# Patient Record
Sex: MALE | NOT HISPANIC OR LATINO | URBAN - METROPOLITAN AREA
[De-identification: names, ages, dates, MRNs, and addresses within clinical notes are randomized per-mention and may not be internally consistent; named-entity substitution may affect disease eponyms.]

---

## 2019-09-09 ENCOUNTER — OFFICE VISIT (OUTPATIENT)
Dept: URBAN - METROPOLITAN AREA CLINIC 32 | Facility: CLINIC | Age: 59
End: 2019-09-09
Payer: COMMERCIAL

## 2019-09-09 DIAGNOSIS — H52.4 PRESBYOPIA: Primary | ICD-10-CM

## 2019-09-09 PROCEDURE — 92014 COMPRE OPH EXAM EST PT 1/>: CPT | Performed by: OPTOMETRIST

## 2019-09-09 PROCEDURE — 92310 CONTACT LENS FITTING OU: CPT | Performed by: OPTOMETRIST

## 2019-09-09 ASSESSMENT — KERATOMETRY
OS: 42.75
OD: 43.25

## 2019-09-09 ASSESSMENT — INTRAOCULAR PRESSURE
OS: 8
OD: 11

## 2021-02-04 ENCOUNTER — OFFICE VISIT (OUTPATIENT)
Dept: URBAN - METROPOLITAN AREA CLINIC 7 | Facility: CLINIC | Age: 61
End: 2021-02-04
Payer: COMMERCIAL

## 2021-02-04 DIAGNOSIS — H04.123 DRY EYE SYNDROME OF BILATERAL LACRIMAL GLANDS: ICD-10-CM

## 2021-02-04 DIAGNOSIS — H43.392 OTHER VITREOUS OPACITIES, LEFT EYE: ICD-10-CM

## 2021-02-04 DIAGNOSIS — Z96.1 PRESENCE OF INTRAOCULAR LENS: ICD-10-CM

## 2021-02-04 DIAGNOSIS — H33.8 OTHER RETINAL DETACHMENTS: ICD-10-CM

## 2021-02-04 PROCEDURE — 92134 CPTRZ OPH DX IMG PST SGM RTA: CPT | Performed by: OPHTHALMOLOGY

## 2021-02-04 PROCEDURE — 99214 OFFICE O/P EST MOD 30 MIN: CPT | Performed by: OPHTHALMOLOGY

## 2021-02-04 ASSESSMENT — INTRAOCULAR PRESSURE
OS: 17
OD: 15

## 2021-02-04 NOTE — IMPRESSION/PLAN
Impression: Vitreous debris, OS. Status: Symptomatic. Plan: The patient has a large floaters OS in his visual axis. He is having difficulty working on the computer to work. Discussed options of observation vs surgery. The patient will consider his options.  If he elects surgery, plan: 25g PPV / MP / PRP OS

## 2021-02-04 NOTE — IMPRESSION/PLAN
Impression: Glaucoma, OU. Status: Symptomatic. C/D: 0.3, OU Plan: Continue to coordinate care. First Trimester Sonogram

## 2021-02-04 NOTE — IMPRESSION/PLAN
Impression: Chronic CME, OD. Status: Symptomatic.
s/p IVK x 13 last 10/20/16
s/p Avastin x 15 last 04/18/19 Plan: The patient was lost to follow up. The patient prefers to be seen in [facility]. A 3 month interval was too long in the past. Was able to treat and extend to 10 weeks in the past. Exam and OCT reveal CME OD (310 microns, from 440 microns). An IVFA from 02/04/2021 demonstrated no telangiectasia. Fundus Photos from 02/04/2021 demonstrated no IRH. Observe. Thanks, Dalila Milton. 

Return in 12 months for re eval CME, OCT OU, possible Avastin, IVFA OD 1st

## 2021-02-04 NOTE — IMPRESSION/PLAN
Impression: h/o RD, OD. Status: Symptomatic.
s/p PPV,SB,GAS,PRP,MP, C3F8 gas 03/24/14 (DYK) Plan: Retina flat. RDW.

## 2021-07-09 ENCOUNTER — OFFICE VISIT (OUTPATIENT)
Dept: URBAN - METROPOLITAN AREA CLINIC 37 | Facility: CLINIC | Age: 61
End: 2021-07-09
Payer: COMMERCIAL

## 2021-07-09 DIAGNOSIS — H40.9 UNSPECIFIED GLAUCOMA: ICD-10-CM

## 2021-07-09 PROCEDURE — 99204 OFFICE O/P NEW MOD 45 MIN: CPT | Performed by: OPHTHALMOLOGY

## 2021-07-09 ASSESSMENT — VISUAL ACUITY
OD: 20/20
OS: 20/40

## 2021-07-09 ASSESSMENT — INTRAOCULAR PRESSURE
OD: 10
OS: 12

## 2021-07-09 NOTE — IMPRESSION/PLAN
Impression: Cataract, OS. Status: Symptomatic. Plan: Cataracts account for the patient's complaints. Discussed all risks, benefits, procedures and recovery. Patient understands changing glasses will not improve vision. Patient desires to have surgery, recommend phacoemulsification with intraocular lens. Patient understands the post operative vision may still require correction and vision is not guaranteed; patient may required additional procedure after cataract surgery. If patient elects multifocal lens discussed risk of glare, halos, need for additional light, and possible mild correction for near and intermediate vision. R/B/A's discussed with patient. Patient desires to have surgery, recommend CE IOL OS 2nd eye (monovision). Recommend IOL TARGET:  -2.50 OS. Patient is candidate for Lensx/ora. Ok for [employer] and [employer]ent, LENS TYPE SA60AT. Return for pre-op to further discuss technology based on testing findings.  Patient needs retina clearance prior to sx and updated VFT/OCT/Pachs with Dr Eddie Leary previously followed by [facility] specialist

## 2021-07-09 NOTE — IMPRESSION/PLAN
Impression: Glaucoma, OU. Status: Symptomatic. C/D: 0.3, OU Plan: Latanoprost QHS OS, Dorzolamide/Timolol QD OU .  IOP holding OU; will need updated VF/OCT/Pachs prior to proceeding with cataract surgery with Dr Johann Wolfe for evaluation

## 2021-09-16 ENCOUNTER — OFFICE VISIT (OUTPATIENT)
Dept: URBAN - METROPOLITAN AREA CLINIC 7 | Facility: CLINIC | Age: 61
End: 2021-09-16
Payer: COMMERCIAL

## 2021-09-16 ENCOUNTER — OFFICE VISIT (OUTPATIENT)
Dept: URBAN - METROPOLITAN AREA CLINIC 24 | Facility: CLINIC | Age: 61
End: 2021-09-16
Payer: COMMERCIAL

## 2021-09-16 DIAGNOSIS — H35.81 RETINAL EDEMA: Primary | ICD-10-CM

## 2021-09-16 DIAGNOSIS — H40.1111 PRIMARY OPEN-ANGLE GLAUCOMA, RIGHT EYE, MILD STAGE: Primary | ICD-10-CM

## 2021-09-16 DIAGNOSIS — H43.813 VITREOUS DEGENERATION, BILATERAL: ICD-10-CM

## 2021-09-16 PROCEDURE — 92134 CPTRZ OPH DX IMG PST SGM RTA: CPT | Performed by: OPHTHALMOLOGY

## 2021-09-16 PROCEDURE — 92235 FLUORESCEIN ANGRPH MLTIFRAME: CPT | Performed by: OPHTHALMOLOGY

## 2021-09-16 PROCEDURE — 76514 ECHO EXAM OF EYE THICKNESS: CPT | Performed by: OPHTHALMOLOGY

## 2021-09-16 PROCEDURE — 92020 GONIOSCOPY: CPT | Performed by: OPHTHALMOLOGY

## 2021-09-16 PROCEDURE — 92083 EXTENDED VISUAL FIELD XM: CPT | Performed by: OPHTHALMOLOGY

## 2021-09-16 PROCEDURE — 99214 OFFICE O/P EST MOD 30 MIN: CPT | Performed by: OPHTHALMOLOGY

## 2021-09-16 PROCEDURE — 92133 CPTRZD OPH DX IMG PST SGM ON: CPT | Performed by: OPHTHALMOLOGY

## 2021-09-16 ASSESSMENT — INTRAOCULAR PRESSURE
OS: 15
OD: 10
OD: 12
OS: 10
OS: 10
OS: 15
OD: 10
OD: 12

## 2021-09-16 NOTE — IMPRESSION/PLAN
Impression: PCIOL OD / Cataract, OS. Status: Symptomatic.  Plan: Cleared for YAG OD and CE OS from the retinal standpoint at your discretion

## 2021-09-16 NOTE — IMPRESSION/PLAN
Impression: h/o Chronic CME, OD. Status: Symptomatic.
s/p IVK x 13 last 10/20/16
s/p Avastin x 15 last 04/18/19 Plan: The patient prefers to be seen in [facility]. A 3 month interval was too long in the past. Exam and OCT reveal CME OD (339 microns, from 440 microns). An IVFA from 09/16/2021 demonstrated no telangiectasia. Fundus Photos from 09/16/2021 demonstrated no IRH. Observe. Thanks, Bennie Martinez Return in 6 months for re eval CME, OCT OU, possible Avastin, IVFA OD 1st

## 2021-09-16 NOTE — IMPRESSION/PLAN
Impression: Primary open-angle glaucoma, right eye, mild stage: H40.1111. Plan: Pt has Glaucoma    Gonio :SS trace/ 3+ CBB Posterior bowing        Pachs:   543/530   Today's IOP12/15         Tmax  :  35/15 Target IOP low to mid teens Pt denies Fhx of Glaucoma Right  eye is the better seeing eye  
C/D:  0.6x0.6/0.5x0.5 OCT:61/84 9/16/21 Pt denies Sulfa Allergy   // Pt denies Lung /Heart dx Plan :
1. Continue Latanoprost QAM OU [email or web address]
Dorzolo/Timolol QD OU [email or web address]
2. IOP and condition appear stable today. No changes being made to current regimen. Recommend monitoring condition at this time. 3. No signs of Glaucoma damage OS. (Consider decreasing drops OS S/P Cataract surgery 4.  Patient cleared for cataract surgery with Dr. Ruth Shows

## 2021-09-27 ENCOUNTER — ADULT PHYSICAL (OUTPATIENT)
Dept: URBAN - METROPOLITAN AREA CLINIC 24 | Facility: CLINIC | Age: 61
End: 2021-09-27
Payer: COMMERCIAL

## 2021-09-27 DIAGNOSIS — Z01.818 ENCOUNTER FOR OTHER PREPROCEDURAL EXAMINATION: Primary | ICD-10-CM

## 2021-09-27 PROCEDURE — 99203 OFFICE O/P NEW LOW 30 MIN: CPT | Performed by: PHYSICIAN ASSISTANT

## 2021-09-27 ASSESSMENT — PACHYMETRY
OS: 27.90
OD: 28.13
OS: 3.69
OD: 5.95

## 2021-09-29 ENCOUNTER — PRE-OPERATIVE VISIT (OUTPATIENT)
Dept: URBAN - METROPOLITAN AREA CLINIC 24 | Facility: CLINIC | Age: 61
End: 2021-09-29
Payer: COMMERCIAL

## 2021-09-29 PROCEDURE — 99214 OFFICE O/P EST MOD 30 MIN: CPT | Performed by: OPHTHALMOLOGY

## 2021-09-29 ASSESSMENT — PACHYMETRY
OD: 5.95
OS: 27.90
OD: 28.13
OS: 3.69

## 2021-09-29 NOTE — IMPRESSION/PLAN
Impression: h/o RD, OD. Status: Symptomatic.
s/p PPV,SB,GAS,PRP,MP, C3F8 gas 03/24/14 (DYK) Plan: Retina flat. RDW. monitor. Discussed with patient, understands this may limit vision after surgery.

## 2021-09-29 NOTE — IMPRESSION/PLAN
Impression: Glaucoma, OU. Status: Symptomatic. C/D: 0.3, OU Plan: Continue to coordinate care. Discussed with patient, understands this may limit vision after surgery.

## 2021-09-29 NOTE — IMPRESSION/PLAN
Impression: PCIOL OD / Cataract, OS. Status: Symptomatic. Plan: Discussed diagnosis in detail with patient. Discussed treatment options with patient. R/B/A of yag cap discussed, pt would like to proceed. Yag OD ordered.  RL2

## 2021-09-29 NOTE — IMPRESSION/PLAN
Impression: PVD, OU. Status: Symptomatic. Plan: RDW. monitor. Discussed with patient, understands this may limit vision after surgery.

## 2021-10-25 ENCOUNTER — TESTING ONLY (OUTPATIENT)
Dept: URBAN - METROPOLITAN AREA CLINIC 24 | Facility: CLINIC | Age: 61
End: 2021-10-25
Payer: COMMERCIAL

## 2021-10-26 ENCOUNTER — ADULT PHYSICAL (OUTPATIENT)
Dept: URBAN - METROPOLITAN AREA CLINIC 24 | Facility: CLINIC | Age: 61
End: 2021-10-26
Payer: COMMERCIAL

## 2021-10-26 PROCEDURE — 99213 OFFICE O/P EST LOW 20 MIN: CPT | Performed by: PHYSICIAN ASSISTANT

## 2021-10-29 ENCOUNTER — TESTING ONLY (OUTPATIENT)
Dept: URBAN - METROPOLITAN AREA CLINIC 24 | Facility: CLINIC | Age: 61
End: 2021-10-29
Payer: COMMERCIAL

## 2021-10-29 DIAGNOSIS — H25.12 AGE-RELATED NUCLEAR CATARACT, LEFT EYE: Primary | ICD-10-CM

## 2021-10-29 ASSESSMENT — PACHYMETRY
OD: 28.06
OD: 5.14
OS: 27.94
OS: 3.66

## 2021-11-01 ENCOUNTER — PRE-OPERATIVE VISIT (OUTPATIENT)
Dept: URBAN - METROPOLITAN AREA CLINIC 24 | Facility: CLINIC | Age: 61
End: 2021-11-01
Payer: COMMERCIAL

## 2021-11-01 DIAGNOSIS — H18.599 OTHER HEREDITARY CORNEAL DYSTROPHIES: ICD-10-CM

## 2021-11-01 PROCEDURE — 99214 OFFICE O/P EST MOD 30 MIN: CPT | Performed by: OPHTHALMOLOGY

## 2021-11-01 NOTE — IMPRESSION/PLAN
Impression: Cataract, OS. Status: Symptomatic. Plan: Discussed risks, benefits and alternatives to surgery including but not limited to: bleeding, infection, risk of vision loss, loss of the eye, need for other surgery. Patient voiced understanding and wishes to proceed. Patient elects surgical treatment. Advanced technology options Discussed with patient . Patient desires surgery OU,   first (( AIM -2.50 OS, DEXYCU, Topical anesthesia, NO Lensx/ORA OS, NO LRI (irreg wilian) )) Patient understands the need for glasses after surgery for BCVA.

## 2021-11-01 NOTE — IMPRESSION/PLAN
Impression: Other hereditary corneal dystrophies: H18.599. Plan: Discussed with patient, understands this may limit vision after surgery. Due to corneal dystrophy and inconsistent measurements not a candidate for specialty lenses/ upgrades.

## 2021-11-02 ENCOUNTER — SURGERY (OUTPATIENT)
Dept: URBAN - METROPOLITAN AREA SURGERY 12 | Facility: SURGERY | Age: 61
End: 2021-11-02
Payer: COMMERCIAL

## 2021-11-02 PROCEDURE — 66984 XCAPSL CTRC RMVL W/O ECP: CPT | Performed by: OPHTHALMOLOGY

## 2021-11-03 ENCOUNTER — POST-OPERATIVE VISIT (OUTPATIENT)
Dept: URBAN - METROPOLITAN AREA CLINIC 24 | Facility: CLINIC | Age: 61
End: 2021-11-03
Payer: COMMERCIAL

## 2021-11-03 PROCEDURE — 99024 POSTOP FOLLOW-UP VISIT: CPT | Performed by: OPTOMETRIST

## 2021-11-03 ASSESSMENT — INTRAOCULAR PRESSURE: OS: 23

## 2021-11-03 NOTE — IMPRESSION/PLAN
Impression: S/P Cataract Extraction by phacoemulsification with IOL placement OS - 1 Day. Encounter for surgical aftercare following surgery on a sense organ  Z48.220.  Plan:

## 2021-11-08 ENCOUNTER — SURGERY (OUTPATIENT)
Dept: URBAN - METROPOLITAN AREA SURGERY 12 | Facility: SURGERY | Age: 61
End: 2021-11-08
Payer: COMMERCIAL

## 2021-11-08 PROCEDURE — 66821 AFTER CATARACT LASER SURGERY: CPT | Performed by: OPHTHALMOLOGY

## 2021-11-15 ENCOUNTER — SURGERY (OUTPATIENT)
Dept: URBAN - METROPOLITAN AREA EXTERNAL CLINIC 26 | Facility: EXTERNAL CLINIC | Age: 61
End: 2021-11-15
Payer: COMMERCIAL

## 2021-11-15 PROCEDURE — 67041 VIT FOR MACULAR PUCKER: CPT | Performed by: OPHTHALMOLOGY

## 2021-11-16 ENCOUNTER — POST-OPERATIVE VISIT (OUTPATIENT)
Dept: URBAN - METROPOLITAN AREA CLINIC 7 | Facility: CLINIC | Age: 61
End: 2021-11-16
Payer: COMMERCIAL

## 2021-11-16 PROCEDURE — 99024 POSTOP FOLLOW-UP VISIT: CPT | Performed by: OPHTHALMOLOGY

## 2021-11-16 RX ORDER — DORZOLAMIDE HYDROCHLORIDE 20 MG/ML
2 % SOLUTION OPHTHALMIC
Qty: 5 | Refills: 3 | Status: ACTIVE
Start: 2021-11-16

## 2021-11-16 ASSESSMENT — INTRAOCULAR PRESSURE
OD: 12
OS: 28

## 2021-11-16 NOTE — IMPRESSION/PLAN
Impression: S/P 25g PPV/ MP/ PRP OS x Vitreous debris OS - 1 Day. 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 Plan: doing well
continue combigan and add trusopt RTC 1 week POS OS w/ DYK --Continue Prednisolone acetate 1%--Continue Ofloxacin 0.3%

## 2021-11-23 ENCOUNTER — POST-OPERATIVE VISIT (OUTPATIENT)
Dept: URBAN - METROPOLITAN AREA CLINIC 24 | Facility: CLINIC | Age: 61
End: 2021-11-23
Payer: COMMERCIAL

## 2021-11-23 DIAGNOSIS — Z48.810 ENCOUNTER FOR SURGICAL AFTERCARE FOLLOWING SURGERY ON A SENSE ORGAN: Primary | ICD-10-CM

## 2021-11-23 PROCEDURE — 99024 POSTOP FOLLOW-UP VISIT: CPT | Performed by: OPTOMETRIST

## 2021-11-23 ASSESSMENT — VISUAL ACUITY
OS: 20/150
OD: 20/25-1

## 2021-11-23 ASSESSMENT — INTRAOCULAR PRESSURE
OS: 10
OD: 8

## 2021-11-23 NOTE — IMPRESSION/PLAN
Impression:  Encounter for surgical aftercare following surgery on a sense organ  Z48.810. Plan: Patient happy with outcome OS. s/p PPVIT with Dr. Garland Wiley. Keep appts as scheduled.

## 2021-12-02 ENCOUNTER — POST-OPERATIVE VISIT (OUTPATIENT)
Dept: URBAN - METROPOLITAN AREA CLINIC 41 | Facility: CLINIC | Age: 61
End: 2021-12-02
Payer: COMMERCIAL

## 2021-12-02 PROCEDURE — 99024 POSTOP FOLLOW-UP VISIT: CPT | Performed by: OPHTHALMOLOGY

## 2021-12-02 ASSESSMENT — INTRAOCULAR PRESSURE
OD: 14
OS: 18

## 2021-12-02 NOTE — IMPRESSION/PLAN
Impression: S/P 25g PPV/ MP/ PRP OS x Vitreous debris OS 11/15/2021 Plan: Retina flat. IOP is good. No sings of infection RTC 3 months OCT OU

## 2022-02-03 ENCOUNTER — OFFICE VISIT (OUTPATIENT)
Dept: URBAN - METROPOLITAN AREA CLINIC 7 | Facility: CLINIC | Age: 62
End: 2022-02-03
Payer: COMMERCIAL

## 2022-02-03 PROCEDURE — 92134 CPTRZ OPH DX IMG PST SGM RTA: CPT | Performed by: OPHTHALMOLOGY

## 2022-02-03 PROCEDURE — 99024 POSTOP FOLLOW-UP VISIT: CPT | Performed by: OPHTHALMOLOGY

## 2022-02-03 ASSESSMENT — INTRAOCULAR PRESSURE
OD: 6
OS: 7

## 2022-02-03 NOTE — IMPRESSION/PLAN
Impression: h/o Vitreous debris, OS. 
S/P 25g PPV/ MP/ PRP OS x Vitreous debris OS 11/15/2021 ([facility]) Plan: Follow

## 2022-02-03 NOTE — IMPRESSION/PLAN
Impression: h/o Chronic CME, OD. Status: Symptomatic.
s/p IVK x 13 last 10/20/16
s/p Avastin x 15 last 04/18/19 Plan: A 3 month interval was too long in the past. Exam and OCT reveal resolved CME OD (321 microns). An IVFA from 09/16/2021 demonstrated no telangiectasia. Fundus Photos from 09/16/2021 demonstrated no IRH. Observe. Monty Soto Age Return in 3 months for re eval CME, OCT OU, IVFA OD 1st, then in 6 months if stable

## 2022-03-03 ENCOUNTER — OFFICE VISIT (OUTPATIENT)
Dept: URBAN - METROPOLITAN AREA CLINIC 7 | Facility: CLINIC | Age: 62
End: 2022-03-03
Payer: COMMERCIAL

## 2022-03-03 DIAGNOSIS — H35.352 CYSTOID MACULAR DEGENERATION, LEFT EYE: Primary | ICD-10-CM

## 2022-03-03 PROCEDURE — 92134 CPTRZ OPH DX IMG PST SGM RTA: CPT | Performed by: OPHTHALMOLOGY

## 2022-03-03 PROCEDURE — 92014 COMPRE OPH EXAM EST PT 1/>: CPT | Performed by: OPHTHALMOLOGY

## 2022-03-03 PROCEDURE — 67028 INJECTION EYE DRUG: CPT | Performed by: OPHTHALMOLOGY

## 2022-03-03 ASSESSMENT — INTRAOCULAR PRESSURE
OS: 15
OD: 17

## 2022-03-03 NOTE — IMPRESSION/PLAN
Impression: h/o Chronic CME, OD. Status: Symptomatic.
s/p IVK x 13 last 10/20/16
s/p Avastin x 15 last 04/18/19 Plan: A 3 month interval was too long in the past. Exam and OCT reveal resolved CME OD. An IVFA from 09/16/2021 demonstrated no telangiectasia. Fundus Photos from 09/16/2021 demonstrated no IRH. Observe.

## 2022-03-03 NOTE — IMPRESSION/PLAN
Impression: Retinal edema OS. Plan: The patient notes decreased VA OS. Exam and OCT reveal CME OS (644 microns). Triesence is on back order. Recommend Avastin. RBA discusssed. The patient elects Avastin OS. Return in 4-6 weeks for re-eval edema, OCT OU, IVFA OS 1st

## 2022-03-03 NOTE — IMPRESSION/PLAN
Impression: h/o Vitreous debris, OS. 
S/P 25g PPV/ MP/ PRP OS x Vitreous debris OS 11/15/2021 (930 [facility]) Plan: Follow

## 2022-04-28 ENCOUNTER — OFFICE VISIT (OUTPATIENT)
Dept: URBAN - METROPOLITAN AREA CLINIC 7 | Facility: CLINIC | Age: 62
End: 2022-04-28
Payer: COMMERCIAL

## 2022-04-28 DIAGNOSIS — H33.8 OTHER RETINAL DETACHMENTS: ICD-10-CM

## 2022-04-28 DIAGNOSIS — H43.392 OTHER VITREOUS OPACITIES, LEFT EYE: ICD-10-CM

## 2022-04-28 DIAGNOSIS — H35.352 CYSTOID MACULAR DEGENERATION, LEFT EYE: Primary | ICD-10-CM

## 2022-04-28 PROCEDURE — 92014 COMPRE OPH EXAM EST PT 1/>: CPT | Performed by: OPHTHALMOLOGY

## 2022-04-28 PROCEDURE — 92134 CPTRZ OPH DX IMG PST SGM RTA: CPT | Performed by: OPHTHALMOLOGY

## 2022-04-28 PROCEDURE — 67028 INJECTION EYE DRUG: CPT | Performed by: OPHTHALMOLOGY

## 2022-04-28 ASSESSMENT — INTRAOCULAR PRESSURE
OS: 13
OD: 14

## 2022-04-28 NOTE — IMPRESSION/PLAN
Impression: h/o Vitreous debris, OS. 
S/P 25g PPV/ MP/ PRP OS x Vitreous debris OS 11/15/2021 (930 [facility]) Plan: Follow

## 2022-04-28 NOTE — IMPRESSION/PLAN
Impression: Retinal edema OS.
s/p Avastin x 1 last 03/03/2022 Plan: The patient notes decreased VA OS. Exam and OCT reveal CME OS (331 microns, from 644 microns). Triesence is on back order. Recommend Avastin. RBA discusssed. The patient elects Avastin OS. Return in 4-6 weeks for re-eval edema, OCT OU, IVFA OS 1st

## 2022-04-28 NOTE — IMPRESSION/PLAN
Impression: h/o Chronic CME, OD. Status: Symptomatic.
s/p IVK x 13 last 10/20/16
s/p Avastin x 15 last 04/18/19 Plan: A 3 month interval was too long in the past. Exam and OCT reveal resolved CME OD. An IVFA from 09/16/2022 demonstrated no telangiectasia. Fundus Photos from 09/16/2022 demonstrated no IRH. Observe.

## 2022-05-26 ENCOUNTER — OFFICE VISIT (OUTPATIENT)
Dept: URBAN - METROPOLITAN AREA CLINIC 7 | Facility: CLINIC | Age: 62
End: 2022-05-26
Payer: COMMERCIAL

## 2022-05-26 DIAGNOSIS — H35.81 RETINAL EDEMA: ICD-10-CM

## 2022-05-26 DIAGNOSIS — H43.813 VITREOUS DEGENERATION, BILATERAL: ICD-10-CM

## 2022-05-26 DIAGNOSIS — H04.123 DRY EYE SYNDROME OF BILATERAL LACRIMAL GLANDS: ICD-10-CM

## 2022-05-26 DIAGNOSIS — H25.12 AGE-RELATED NUCLEAR CATARACT, LEFT EYE: ICD-10-CM

## 2022-05-26 PROCEDURE — 99214 OFFICE O/P EST MOD 30 MIN: CPT | Performed by: OPHTHALMOLOGY

## 2022-05-26 PROCEDURE — 92134 CPTRZ OPH DX IMG PST SGM RTA: CPT | Performed by: OPHTHALMOLOGY

## 2022-05-26 PROCEDURE — 92235 FLUORESCEIN ANGRPH MLTIFRAME: CPT | Performed by: OPHTHALMOLOGY

## 2022-05-26 PROCEDURE — 67028 INJECTION EYE DRUG: CPT | Performed by: OPHTHALMOLOGY

## 2022-05-26 ASSESSMENT — INTRAOCULAR PRESSURE
OS: 13
OD: 13

## 2022-05-26 NOTE — IMPRESSION/PLAN
Impression: Retinal edema OS.
s/p Avastin x  last 04/28/2022  Plan: The patient notes decreased VA OS. Exam and OCT reveal CME OS (314 microns, from 331 microns, from 644 microns). An IVFA from 05/26/2022 demonstrated no telangiectasia. Fundus Photos from 05/26/2022 demonstrated no IRH. Triesence is on back order. Recommend Avastin. RBA discusssed. The patient elects Avastin OS. Return in 4-6 weeks for re-eval edema, OCT OU

## 2022-05-26 NOTE — IMPRESSION/PLAN
Impression: h/o Vitreous debris, OS. 
S/P 25g PPV/ MP/ PRP OS x Vitreous debris OS 11/15/2021 (930 [facility]) Plan: Follow

## 2022-05-26 NOTE — IMPRESSION/PLAN
Impression: h/o Chronic CME, OD. Status: Symptomatic.
s/p IVK x 13 last 10/20/16
s/p Avastin x 15 last 04/18/19 Plan: A 3 month interval was too long in the past. Exam and OCT reveal resolved CME OD. An IVFA from 05/26/2022 demonstrated no telangiectasia. Fundus Photos from 05/26/2022 demonstrated no IRH. Observe.

## 2022-06-23 ENCOUNTER — OFFICE VISIT (OUTPATIENT)
Dept: URBAN - METROPOLITAN AREA CLINIC 7 | Facility: CLINIC | Age: 62
End: 2022-06-23
Payer: COMMERCIAL

## 2022-06-23 DIAGNOSIS — H43.813 VITREOUS DEGENERATION, BILATERAL: ICD-10-CM

## 2022-06-23 DIAGNOSIS — H04.123 DRY EYE SYNDROME OF BILATERAL LACRIMAL GLANDS: ICD-10-CM

## 2022-06-23 DIAGNOSIS — H25.12 AGE-RELATED NUCLEAR CATARACT, LEFT EYE: ICD-10-CM

## 2022-06-23 DIAGNOSIS — H43.392 OTHER VITREOUS OPACITIES, LEFT EYE: ICD-10-CM

## 2022-06-23 DIAGNOSIS — H35.81 RETINAL EDEMA: Primary | ICD-10-CM

## 2022-06-23 DIAGNOSIS — H33.8 OTHER RETINAL DETACHMENTS: ICD-10-CM

## 2022-06-23 PROCEDURE — 67028 INJECTION EYE DRUG: CPT | Performed by: OPHTHALMOLOGY

## 2022-06-23 PROCEDURE — 92134 CPTRZ OPH DX IMG PST SGM RTA: CPT | Performed by: OPHTHALMOLOGY

## 2022-06-23 PROCEDURE — 92014 COMPRE OPH EXAM EST PT 1/>: CPT | Performed by: OPHTHALMOLOGY

## 2022-06-23 ASSESSMENT — INTRAOCULAR PRESSURE
OS: 13
OD: 15

## 2022-06-23 NOTE — IMPRESSION/PLAN
Impression: Retinal edema OS.
s/p Avastin x  last 05/26/2022 Plan: The patient notes decreased VA OS. Exam and OCT reveal CME OS (313 microns, from 331 microns, from 644 microns). An IVFA from 05/26/2022 demonstrated no telangiectasia. Fundus Photos from 05/26/2022 demonstrated no IRH. Triesence is on back order. Recommend Avastin. RBA discusssed. The patient elects Avastin OS. Return in 4-6 weeks for re-eval edema, OCT OU

## 2022-07-21 ENCOUNTER — OFFICE VISIT (OUTPATIENT)
Dept: URBAN - METROPOLITAN AREA CLINIC 7 | Facility: CLINIC | Age: 62
End: 2022-07-21
Payer: COMMERCIAL

## 2022-07-21 DIAGNOSIS — H33.8 OTHER RETINAL DETACHMENTS: ICD-10-CM

## 2022-07-21 DIAGNOSIS — H04.123 DRY EYE SYNDROME OF BILATERAL LACRIMAL GLANDS: ICD-10-CM

## 2022-07-21 DIAGNOSIS — H35.352 CYSTOID MACULAR DEGENERATION, LEFT EYE: ICD-10-CM

## 2022-07-21 DIAGNOSIS — H35.81 RETINAL EDEMA: Primary | ICD-10-CM

## 2022-07-21 DIAGNOSIS — H43.392 OTHER VITREOUS OPACITIES, LEFT EYE: ICD-10-CM

## 2022-07-21 DIAGNOSIS — H43.813 VITREOUS DEGENERATION, BILATERAL: ICD-10-CM

## 2022-07-21 DIAGNOSIS — H25.12 AGE-RELATED NUCLEAR CATARACT, LEFT EYE: ICD-10-CM

## 2022-07-21 PROCEDURE — 92134 CPTRZ OPH DX IMG PST SGM RTA: CPT | Performed by: OPHTHALMOLOGY

## 2022-07-21 PROCEDURE — 67028 INJECTION EYE DRUG: CPT | Performed by: OPHTHALMOLOGY

## 2022-07-21 PROCEDURE — 92014 COMPRE OPH EXAM EST PT 1/>: CPT | Performed by: OPHTHALMOLOGY

## 2022-07-21 ASSESSMENT — INTRAOCULAR PRESSURE
OS: 15
OD: 15

## 2022-07-21 NOTE — IMPRESSION/PLAN
Impression: h/o Vitreous debris, OS. 
S/P 25g PPV/ MP/ PRP OS x Vitreous debris OS 11/15/2021 (930 [facility]) Plan: Follow

## 2022-07-21 NOTE — IMPRESSION/PLAN
Impression: Retinal edema OS.
s/p Avastin x  last 06/23/2022 Plan: The patient notes decreased VA OS. Exam and OCT reveal CME OS (308 microns, from 331 microns, from 644 microns). An IVFA from 05/26/2022 demonstrated no telangiectasia. Fundus Photos from 05/26/2022 demonstrated no IRH. Triesence is on back order. Recommend Avastin. RBA discusssed. The patient elects Avastin OS. Return in 4-6 weeks for re-eval edema, OCT OU

## 2022-09-01 ENCOUNTER — OFFICE VISIT (OUTPATIENT)
Dept: URBAN - METROPOLITAN AREA CLINIC 13 | Facility: CLINIC | Age: 62
End: 2022-09-01
Payer: COMMERCIAL

## 2022-09-01 DIAGNOSIS — H04.123 DRY EYE SYNDROME OF BILATERAL LACRIMAL GLANDS: ICD-10-CM

## 2022-09-01 DIAGNOSIS — H33.8 OTHER RETINAL DETACHMENTS: ICD-10-CM

## 2022-09-01 DIAGNOSIS — H43.392 OTHER VITREOUS OPACITIES, LEFT EYE: ICD-10-CM

## 2022-09-01 DIAGNOSIS — H35.81 RETINAL EDEMA: Primary | ICD-10-CM

## 2022-09-01 DIAGNOSIS — H25.12 AGE-RELATED NUCLEAR CATARACT, LEFT EYE: ICD-10-CM

## 2022-09-01 DIAGNOSIS — H43.813 VITREOUS DEGENERATION, BILATERAL: ICD-10-CM

## 2022-09-01 PROCEDURE — 92134 CPTRZ OPH DX IMG PST SGM RTA: CPT | Performed by: OPHTHALMOLOGY

## 2022-09-01 PROCEDURE — 99214 OFFICE O/P EST MOD 30 MIN: CPT | Performed by: OPHTHALMOLOGY

## 2022-09-01 PROCEDURE — 67028 INJECTION EYE DRUG: CPT | Performed by: OPHTHALMOLOGY

## 2022-09-01 ASSESSMENT — INTRAOCULAR PRESSURE
OS: 14
OD: 20

## 2022-09-01 NOTE — IMPRESSION/PLAN
Impression: Retinal edema OS.
s/p Avastin x  last 07/21/2022 Plan: The patient notes decreased VA OS. Exam and OCT reveal CME OS (300 microns, from 308 microns, from 331 microns, from 644 microns). An IVFA from 05/26/2022 demonstrated no telangiectasia. Fundus Photos from 05/26/2022 demonstrated no IRH. Triesence is on back order. Recommend Avastin. RBA discusssed. The patient elects Avastin OS. Return in 4-6 weeks for re-eval edema, OCT OU

## 2022-09-01 NOTE — IMPRESSION/PLAN
Impression: h/o Vitreous debris, OS. 
S/P 25g PPV/ MP/ PRP OS x Vitreous debris OS 11/15/2021 (930 [facility]) Plan: Follow

## 2022-10-27 ENCOUNTER — OFFICE VISIT (OUTPATIENT)
Dept: URBAN - METROPOLITAN AREA CLINIC 13 | Facility: CLINIC | Age: 62
End: 2022-10-27
Payer: COMMERCIAL

## 2022-10-27 DIAGNOSIS — H43.813 VITREOUS DEGENERATION, BILATERAL: ICD-10-CM

## 2022-10-27 DIAGNOSIS — H33.8 OTHER RETINAL DETACHMENTS: ICD-10-CM

## 2022-10-27 DIAGNOSIS — H35.81 RETINAL EDEMA: Primary | ICD-10-CM

## 2022-10-27 DIAGNOSIS — H43.392 OTHER VITREOUS OPACITIES, LEFT EYE: ICD-10-CM

## 2022-10-27 DIAGNOSIS — H04.123 DRY EYE SYNDROME OF BILATERAL LACRIMAL GLANDS: ICD-10-CM

## 2022-10-27 DIAGNOSIS — H25.12 AGE-RELATED NUCLEAR CATARACT, LEFT EYE: ICD-10-CM

## 2022-10-27 PROCEDURE — 92014 COMPRE OPH EXAM EST PT 1/>: CPT | Performed by: OPHTHALMOLOGY

## 2022-10-27 PROCEDURE — 67028 INJECTION EYE DRUG: CPT | Performed by: OPHTHALMOLOGY

## 2022-10-27 PROCEDURE — 92134 CPTRZ OPH DX IMG PST SGM RTA: CPT | Performed by: OPHTHALMOLOGY

## 2022-10-27 ASSESSMENT — INTRAOCULAR PRESSURE
OD: 13
OS: 11

## 2022-10-27 NOTE — IMPRESSION/PLAN
Impression: h/o Vitreous debris, OS. 
S/P 25g PPV/ MP/ PRP OS x Vitreous debris OS 11/15/2021 (930 [facility]) Plan: Follow

## 2022-10-27 NOTE — IMPRESSION/PLAN
Impression: Retinal edema OS.
s/p Avastin x  last 09/01/2022  Plan: The patient notes decreased VA OS. Exam and OCT reveal CME OS (246 microns, from 300 microns, from 308 microns, from 331 microns, from 644 microns). An IVFA from 05/26/2022 demonstrated no telangiectasia. Fundus Photos from 05/26/2022 demonstrated no IRH. Triesence is on back order. Recommend Avastin. RBA discusssed. The patient elects Avastin OS. Return in 4-6 weeks for re-eval edema, OCT OU

## 2022-12-08 ENCOUNTER — OFFICE VISIT (OUTPATIENT)
Dept: URBAN - METROPOLITAN AREA CLINIC 7 | Facility: CLINIC | Age: 62
End: 2022-12-08
Payer: COMMERCIAL

## 2022-12-08 DIAGNOSIS — H04.123 DRY EYE SYNDROME OF BILATERAL LACRIMAL GLANDS: ICD-10-CM

## 2022-12-08 DIAGNOSIS — H35.81 RETINAL EDEMA: Primary | ICD-10-CM

## 2022-12-08 DIAGNOSIS — H25.12 AGE-RELATED NUCLEAR CATARACT, LEFT EYE: ICD-10-CM

## 2022-12-08 DIAGNOSIS — H43.392 OTHER VITREOUS OPACITIES, LEFT EYE: ICD-10-CM

## 2022-12-08 DIAGNOSIS — H43.813 VITREOUS DEGENERATION, BILATERAL: ICD-10-CM

## 2022-12-08 DIAGNOSIS — H33.8 OTHER RETINAL DETACHMENTS: ICD-10-CM

## 2022-12-08 PROCEDURE — 99214 OFFICE O/P EST MOD 30 MIN: CPT | Performed by: OPHTHALMOLOGY

## 2022-12-08 PROCEDURE — 92134 CPTRZ OPH DX IMG PST SGM RTA: CPT | Performed by: OPHTHALMOLOGY

## 2022-12-08 PROCEDURE — 67028 INJECTION EYE DRUG: CPT | Performed by: OPHTHALMOLOGY

## 2022-12-08 ASSESSMENT — INTRAOCULAR PRESSURE
OS: 14
OD: 17

## 2022-12-08 NOTE — IMPRESSION/PLAN
Impression: h/o Vitreous debris, OS. 
S/P 25g PPV/ MP/ PRP OS x Vitreous debris OS 11/15/2021 (930 [facility]) Plan: Follow

## 2022-12-08 NOTE — IMPRESSION/PLAN
Impression: Retinal edema OS.
s/p Avastin x  last 10/27/2022 Plan: The patient notes decreased VA OS. Exam and OCT reveal extrafoveal CME OS (298 microns, from 246 microns, from 300 microns, from 308 microns, from 331 microns, from 644 microns). An IVFA from 05/26/2022 demonstrated no telangiectasia. Fundus Photos from 05/26/2022 demonstrated no IRH. Triesence is on back order. Recommend Avastin. RBA discusssed. The patient elects Avastin OS. Return in 4-6 weeks for re-eval edema, OCT OU, and patient will attempt to treat and extend (expedite)

## 2023-01-27 ENCOUNTER — OFFICE VISIT (OUTPATIENT)
Dept: URBAN - METROPOLITAN AREA CLINIC 27 | Facility: CLINIC | Age: 63
End: 2023-01-27
Payer: COMMERCIAL

## 2023-01-27 DIAGNOSIS — H43.392 OTHER VITREOUS OPACITIES, LEFT EYE: ICD-10-CM

## 2023-01-27 DIAGNOSIS — H25.12 AGE-RELATED NUCLEAR CATARACT, LEFT EYE: ICD-10-CM

## 2023-01-27 DIAGNOSIS — H33.8 OTHER RETINAL DETACHMENTS: ICD-10-CM

## 2023-01-27 DIAGNOSIS — H35.81 RETINAL EDEMA: Primary | ICD-10-CM

## 2023-01-27 DIAGNOSIS — H04.123 DRY EYE SYNDROME OF BILATERAL LACRIMAL GLANDS: ICD-10-CM

## 2023-01-27 DIAGNOSIS — H43.813 VITREOUS DEGENERATION, BILATERAL: ICD-10-CM

## 2023-01-27 PROCEDURE — 92134 CPTRZ OPH DX IMG PST SGM RTA: CPT | Performed by: OPHTHALMOLOGY

## 2023-01-27 PROCEDURE — 67028 INJECTION EYE DRUG: CPT | Performed by: OPHTHALMOLOGY

## 2023-01-27 PROCEDURE — 92014 COMPRE OPH EXAM EST PT 1/>: CPT | Performed by: OPHTHALMOLOGY

## 2023-01-27 ASSESSMENT — INTRAOCULAR PRESSURE
OD: 15
OS: 18

## 2023-01-27 NOTE — IMPRESSION/PLAN
Impression: Retinal edema OS.
s/p Avastin x  last 12/8/2023 Plan: The patient notes decreased VA OS. Exam and OCT reveal extrafoveal CME OS (296 microns, from 246 microns, from 300 microns, from 308 microns, from 331 microns, from 644 microns). An IVFA from 05/26/2022 demonstrated no telangiectasia. Fundus Photos from 05/26/2022 demonstrated no IRH. Triesence is on back order. Recommend Avastin. RBA discusssed. The patient elects Avastin OS. Return in 4-6 weeks for re-eval edema, OCT OU, and patient will attempt to treat and extend (expedite)

## 2023-01-27 NOTE — IMPRESSION/PLAN
Impression: h/o Vitreous debris, OS. 
S/P 25g PPV/ MP/ PRP OS x Vitreous debris OS 11/15/2021 (930 [facility]) Plan: Follow

## 2023-03-03 ENCOUNTER — OFFICE VISIT (OUTPATIENT)
Dept: URBAN - METROPOLITAN AREA CLINIC 27 | Facility: CLINIC | Age: 63
End: 2023-03-03
Payer: COMMERCIAL

## 2023-03-03 DIAGNOSIS — H35.81 RETINAL EDEMA: Primary | ICD-10-CM

## 2023-03-03 DIAGNOSIS — H43.392 OTHER VITREOUS OPACITIES, LEFT EYE: ICD-10-CM

## 2023-03-03 DIAGNOSIS — H33.8 OTHER RETINAL DETACHMENTS: ICD-10-CM

## 2023-03-03 DIAGNOSIS — H04.123 DRY EYE SYNDROME OF BILATERAL LACRIMAL GLANDS: ICD-10-CM

## 2023-03-03 DIAGNOSIS — H43.813 VITREOUS DEGENERATION, BILATERAL: ICD-10-CM

## 2023-03-03 DIAGNOSIS — H25.12 AGE-RELATED NUCLEAR CATARACT, LEFT EYE: ICD-10-CM

## 2023-03-03 PROCEDURE — 67028 INJECTION EYE DRUG: CPT | Performed by: OPHTHALMOLOGY

## 2023-03-03 PROCEDURE — 92014 COMPRE OPH EXAM EST PT 1/>: CPT | Performed by: OPHTHALMOLOGY

## 2023-03-03 PROCEDURE — 92134 CPTRZ OPH DX IMG PST SGM RTA: CPT | Performed by: OPHTHALMOLOGY

## 2023-03-03 ASSESSMENT — INTRAOCULAR PRESSURE
OS: 16
OD: 11

## 2023-03-03 NOTE — IMPRESSION/PLAN
Impression: h/o Vitreous debris, OS. 
S/P 25g PPV/ MP/ PRP OS x Vitreous debris OS 11/15/2021 (930 [facility]) Plan: Follow

## 2023-03-03 NOTE — IMPRESSION/PLAN
Impression: Retinal edema OS.
/ s/p Avastin x  last 01/27/2023  Plan: The patient notes decreased VA OS. Exam and OCT reveal extrafoveal CME OS (293 microns, from 246 microns, from 300 microns, from 308 microns, from 331 microns, from 644 microns). An IVFA from 05/26/2022 demonstrated no telangiectasia. Fundus Photos from 05/26/2022 demonstrated no IRH. Triesence is on back order. Recommend Avastin. RBA discusssed. The patient elects Avastin OS. Return in 6-8  weeks for re-eval edema, OCT OU, and patient will attempt to treat and extend (expedite)

## 2023-05-03 ENCOUNTER — OFFICE VISIT (OUTPATIENT)
Dept: URBAN - METROPOLITAN AREA CLINIC 27 | Facility: CLINIC | Age: 63
End: 2023-05-03
Payer: COMMERCIAL

## 2023-05-03 DIAGNOSIS — H25.12 AGE-RELATED NUCLEAR CATARACT, LEFT EYE: ICD-10-CM

## 2023-05-03 DIAGNOSIS — H43.392 OTHER VITREOUS OPACITIES, LEFT EYE: ICD-10-CM

## 2023-05-03 DIAGNOSIS — H43.813 VITREOUS DEGENERATION, BILATERAL: ICD-10-CM

## 2023-05-03 DIAGNOSIS — H35.81 RETINAL EDEMA: Primary | ICD-10-CM

## 2023-05-03 DIAGNOSIS — H33.8 OTHER RETINAL DETACHMENTS: ICD-10-CM

## 2023-05-03 DIAGNOSIS — H04.123 DRY EYE SYNDROME OF BILATERAL LACRIMAL GLANDS: ICD-10-CM

## 2023-05-03 PROCEDURE — 92134 CPTRZ OPH DX IMG PST SGM RTA: CPT | Performed by: OPHTHALMOLOGY

## 2023-05-03 PROCEDURE — 99214 OFFICE O/P EST MOD 30 MIN: CPT | Performed by: OPHTHALMOLOGY

## 2023-05-03 ASSESSMENT — INTRAOCULAR PRESSURE
OS: 14
OD: 17

## 2023-05-03 NOTE — IMPRESSION/PLAN
Impression: h/o Vitreous debris, OS. 
S/P 25g PPV/ MP/ PRP OS x Vitreous debris OS 11/15/2021 (930 [facility]) Plan: Follow

## 2023-05-03 NOTE — IMPRESSION/PLAN
Impression: Retinal edema OS.
s/p Avastin x  last 03/03/2023 Plan: The patient notes decreased VA OS. Exam and OCT reveal extrafoveal CME OS (249 microns, from 293 microns, from 246 microns, from 300 microns, from 308 microns, from 331 microns, from 644 microns). An IVFA from 05/26/2022 demonstrated no telangiectasia. Fundus Photos from 05/26/2022 demonstrated no IRH. Triesence is on back order. Recommend Avastin. RBA discusssed. The patient elects Avastin OS. Carotenoids. 

Return in 12 weeks for re-eval edema, OCT OU, and patient will attempt to treat and extend (expedite)

## 2023-07-27 ENCOUNTER — OFFICE VISIT (OUTPATIENT)
Dept: URBAN - METROPOLITAN AREA CLINIC 35 | Facility: CLINIC | Age: 63
End: 2023-07-27
Payer: COMMERCIAL

## 2023-07-27 DIAGNOSIS — H33.8 OTHER RETINAL DETACHMENTS: ICD-10-CM

## 2023-07-27 DIAGNOSIS — H35.81 RETINAL EDEMA: Primary | ICD-10-CM

## 2023-07-27 PROCEDURE — 99213 OFFICE O/P EST LOW 20 MIN: CPT | Performed by: OPHTHALMOLOGY

## 2023-07-27 PROCEDURE — 92134 CPTRZ OPH DX IMG PST SGM RTA: CPT | Performed by: OPHTHALMOLOGY

## 2023-07-27 ASSESSMENT — INTRAOCULAR PRESSURE
OS: 14
OD: 24

## 2023-09-07 ENCOUNTER — OFFICE VISIT (OUTPATIENT)
Dept: URBAN - METROPOLITAN AREA CLINIC 35 | Facility: CLINIC | Age: 63
End: 2023-09-07
Payer: COMMERCIAL

## 2023-09-07 DIAGNOSIS — H35.81 RETINAL EDEMA: Primary | ICD-10-CM

## 2023-09-07 DIAGNOSIS — H33.8 OTHER RETINAL DETACHMENTS: ICD-10-CM

## 2023-09-07 PROCEDURE — 92134 CPTRZ OPH DX IMG PST SGM RTA: CPT | Performed by: OPHTHALMOLOGY

## 2023-09-07 PROCEDURE — 99213 OFFICE O/P EST LOW 20 MIN: CPT | Performed by: OPHTHALMOLOGY

## 2023-09-07 ASSESSMENT — INTRAOCULAR PRESSURE
OD: 20
OS: 19

## 2023-12-21 ENCOUNTER — OFFICE VISIT (OUTPATIENT)
Dept: URBAN - METROPOLITAN AREA CLINIC 35 | Facility: CLINIC | Age: 63
End: 2023-12-21
Payer: COMMERCIAL

## 2023-12-21 DIAGNOSIS — H33.8 OTHER RETINAL DETACHMENTS: ICD-10-CM

## 2023-12-21 DIAGNOSIS — H35.81 RETINAL EDEMA: Primary | ICD-10-CM

## 2023-12-21 PROCEDURE — 99213 OFFICE O/P EST LOW 20 MIN: CPT | Performed by: OPHTHALMOLOGY

## 2023-12-21 PROCEDURE — 92134 CPTRZ OPH DX IMG PST SGM RTA: CPT | Performed by: OPHTHALMOLOGY

## 2023-12-21 ASSESSMENT — INTRAOCULAR PRESSURE
OD: 12
OS: 9

## 2024-04-01 ENCOUNTER — APPOINTMENT (RX ONLY)
Dept: URBAN - METROPOLITAN AREA CLINIC 321 | Facility: CLINIC | Age: 64
Setting detail: DERMATOLOGY
End: 2024-04-01

## 2024-04-01 DIAGNOSIS — L81.4 OTHER MELANIN HYPERPIGMENTATION: ICD-10-CM | Status: STABLE

## 2024-04-01 DIAGNOSIS — L82.1 OTHER SEBORRHEIC KERATOSIS: ICD-10-CM | Status: STABLE

## 2024-04-01 DIAGNOSIS — D18.0 HEMANGIOMA: ICD-10-CM | Status: STABLE

## 2024-04-01 DIAGNOSIS — D22 MELANOCYTIC NEVI: ICD-10-CM | Status: STABLE

## 2024-04-01 DIAGNOSIS — L57.8 OTHER SKIN CHANGES DUE TO CHRONIC EXPOSURE TO NONIONIZING RADIATION: ICD-10-CM | Status: STABLE

## 2024-04-01 DIAGNOSIS — L90.5 SCAR CONDITIONS AND FIBROSIS OF SKIN: ICD-10-CM | Status: STABLE

## 2024-04-01 DIAGNOSIS — D485 NEOPLASM OF UNCERTAIN BEHAVIOR OF SKIN: ICD-10-CM | Status: STABLE

## 2024-04-01 PROBLEM — D22.5 MELANOCYTIC NEVI OF TRUNK: Status: ACTIVE | Noted: 2024-04-01

## 2024-04-01 PROBLEM — D18.01 HEMANGIOMA OF SKIN AND SUBCUTANEOUS TISSUE: Status: ACTIVE | Noted: 2024-04-01

## 2024-04-01 PROBLEM — D48.5 NEOPLASM OF UNCERTAIN BEHAVIOR OF SKIN: Status: ACTIVE | Noted: 2024-04-01

## 2024-04-01 PROCEDURE — ? TREATMENT REGIMEN

## 2024-04-01 PROCEDURE — 99203 OFFICE O/P NEW LOW 30 MIN: CPT

## 2024-04-01 PROCEDURE — ? ADDITIONAL NOTES

## 2024-04-01 PROCEDURE — ? SUNSCREEN TREATMENT REGIMEN

## 2024-04-01 PROCEDURE — ? FULL BODY SKIN EXAM

## 2024-04-01 PROCEDURE — ? COUNSELING

## 2024-04-01 PROCEDURE — ? SUNSCREEN RECOMMENDATIONS

## 2024-04-01 PROCEDURE — ? DEFER

## 2024-04-01 ASSESSMENT — LOCATION DETAILED DESCRIPTION DERM
LOCATION DETAILED: LEFT DISTAL POSTERIOR UPPER ARM
LOCATION DETAILED: RIGHT SUPERIOR UPPER BACK
LOCATION DETAILED: RIGHT POSTERIOR SHOULDER
LOCATION DETAILED: LEFT SUPERIOR UPPER BACK
LOCATION DETAILED: RIGHT PROXIMAL POSTERIOR UPPER ARM
LOCATION DETAILED: EPIGASTRIC SKIN
LOCATION DETAILED: LEFT POSTERIOR SHOULDER
LOCATION DETAILED: LEFT LATERAL UPPER BACK
LOCATION DETAILED: LEFT SUPERIOR LATERAL MIDBACK
LOCATION DETAILED: RIGHT ULNAR DORSAL HAND
LOCATION DETAILED: LEFT SUPERIOR LATERAL BUCCAL CHEEK
LOCATION DETAILED: RIGHT LATERAL UPPER BACK
LOCATION DETAILED: RIGHT INFERIOR UPPER BACK
LOCATION DETAILED: RIGHT SUPERIOR MEDIAL BUCCAL CHEEK
LOCATION DETAILED: GLABELLA
LOCATION DETAILED: RIGHT DISTAL POSTERIOR UPPER ARM
LOCATION DETAILED: RIGHT MEDIAL BREAST 2-3:00 REGION
LOCATION DETAILED: STERNAL NOTCH
LOCATION DETAILED: STERNUM
LOCATION DETAILED: SUPERIOR THORACIC SPINE

## 2024-04-01 ASSESSMENT — LOCATION ZONE DERM
LOCATION ZONE: TRUNK
LOCATION ZONE: FACE
LOCATION ZONE: HAND
LOCATION ZONE: ARM

## 2024-04-01 ASSESSMENT — LOCATION SIMPLE DESCRIPTION DERM
LOCATION SIMPLE: LEFT CHEEK
LOCATION SIMPLE: ABDOMEN
LOCATION SIMPLE: RIGHT UPPER BACK
LOCATION SIMPLE: RIGHT UPPER ARM
LOCATION SIMPLE: UPPER BACK
LOCATION SIMPLE: RIGHT SHOULDER
LOCATION SIMPLE: LEFT UPPER BACK
LOCATION SIMPLE: LEFT LOWER BACK
LOCATION SIMPLE: LEFT UPPER ARM
LOCATION SIMPLE: RIGHT CHEEK
LOCATION SIMPLE: LEFT SHOULDER
LOCATION SIMPLE: RIGHT HAND
LOCATION SIMPLE: GLABELLA
LOCATION SIMPLE: CHEST
LOCATION SIMPLE: RIGHT BREAST

## 2024-04-01 ASSESSMENT — PAIN INTENSITY VAS: HOW INTENSE IS YOUR PAIN 0 BEING NO PAIN, 10 BEING THE MOST SEVERE PAIN POSSIBLE?: NO PAIN

## 2024-04-01 NOTE — PROCEDURE: ADDITIONAL NOTES
Detail Level: Detailed
Render Risk Assessment In Note?: yes
Additional Notes: **Discussed that we would monitor these areas for 2 months and that if lesions have not completely healed he should follow up in the office for further evaluation and possible biopsy. Patient declines photos today

## 2024-04-01 NOTE — PROCEDURE: DEFER
Size Of Lesion In Cm (Optional): 0.9
Detail Level: Detailed
Introduction Text (Please End With A Colon): :
Procedure To Be Performed At Next Visit: Biopsy by shave method

## 2024-04-18 ENCOUNTER — OFFICE VISIT (OUTPATIENT)
Dept: URBAN - METROPOLITAN AREA CLINIC 35 | Facility: CLINIC | Age: 64
End: 2024-04-18
Payer: COMMERCIAL

## 2024-04-18 DIAGNOSIS — H33.8 OTHER RETINAL DETACHMENTS: ICD-10-CM

## 2024-04-18 DIAGNOSIS — H35.81 RETINAL EDEMA: Primary | ICD-10-CM

## 2024-04-18 PROCEDURE — 99213 OFFICE O/P EST LOW 20 MIN: CPT | Performed by: OPHTHALMOLOGY

## 2024-04-18 PROCEDURE — 92134 CPTRZ OPH DX IMG PST SGM RTA: CPT | Performed by: OPHTHALMOLOGY

## 2024-04-18 ASSESSMENT — INTRAOCULAR PRESSURE
OS: 14
OD: 17

## 2025-04-17 ENCOUNTER — OFFICE VISIT (OUTPATIENT)
Dept: URBAN - METROPOLITAN AREA CLINIC 35 | Facility: CLINIC | Age: 65
End: 2025-04-17
Payer: COMMERCIAL

## 2025-04-17 DIAGNOSIS — H35.81 RETINAL EDEMA: Primary | ICD-10-CM

## 2025-04-17 DIAGNOSIS — H33.8 OTHER RETINAL DETACHMENTS: ICD-10-CM

## 2025-04-17 PROCEDURE — 92014 COMPRE OPH EXAM EST PT 1/>: CPT | Performed by: OPHTHALMOLOGY

## 2025-04-17 PROCEDURE — 92134 CPTRZ OPH DX IMG PST SGM RTA: CPT | Performed by: OPHTHALMOLOGY

## 2025-04-17 ASSESSMENT — INTRAOCULAR PRESSURE
OS: 18
OD: 21